# Patient Record
Sex: MALE | URBAN - METROPOLITAN AREA
[De-identification: names, ages, dates, MRNs, and addresses within clinical notes are randomized per-mention and may not be internally consistent; named-entity substitution may affect disease eponyms.]

---

## 2023-05-15 ENCOUNTER — OFFICE VISIT (OUTPATIENT)
Dept: URGENT CARE | Facility: CLINIC | Age: 38
End: 2023-05-15
Payer: COMMERCIAL

## 2023-05-15 VITALS
DIASTOLIC BLOOD PRESSURE: 119 MMHG | HEART RATE: 95 BPM | WEIGHT: 152 LBS | SYSTOLIC BLOOD PRESSURE: 174 MMHG | TEMPERATURE: 98 F | HEIGHT: 70 IN | OXYGEN SATURATION: 98 % | RESPIRATION RATE: 16 BRPM | BODY MASS INDEX: 21.76 KG/M2

## 2023-05-15 DIAGNOSIS — L03.90 CELLULITIS, UNSPECIFIED CELLULITIS SITE: Primary | ICD-10-CM

## 2023-05-15 PROCEDURE — 99203 PR OFFICE/OUTPT VISIT, NEW, LEVL III, 30-44 MIN: ICD-10-PCS | Mod: S$GLB,,, | Performed by: EMERGENCY MEDICINE

## 2023-05-15 PROCEDURE — 99203 OFFICE O/P NEW LOW 30 MIN: CPT | Mod: S$GLB,,, | Performed by: EMERGENCY MEDICINE

## 2023-05-15 RX ORDER — SULFAMETHOXAZOLE AND TRIMETHOPRIM 800; 160 MG/1; MG/1
1 TABLET ORAL 2 TIMES DAILY
Qty: 20 TABLET | Refills: 0 | Status: SHIPPED | OUTPATIENT
Start: 2023-05-15 | End: 2023-05-25

## 2023-05-15 RX ORDER — DIPHENHYDRAMINE HCL 12.5MG/5ML
25 ELIXIR ORAL 4 TIMES DAILY PRN
Qty: 236 ML | Refills: 0 | Status: SHIPPED | OUTPATIENT
Start: 2023-05-15

## 2023-05-15 NOTE — PROGRESS NOTES
"Subjective:      Patient ID: Orlin Carbajal is a 37 y.o. male.    Vitals:  height is 5' 10" (1.778 m) and weight is 68.9 kg (152 lb). His temperature is 98.4 °F (36.9 °C). His blood pressure is 174/119 (abnormal) and his pulse is 95. His respiration is 16 and oxygen saturation is 98%.     Chief Complaint: Insect Bite    Pt believes he got bit by spider on right arm, rash started about an hour & half ago. The past 20 mins spot has swollen up.  Has spot of redness on the right forearm close to the wrist without any fluctuance and some redness in the forearm proximal to that which is streaky in nature    Constitution: Negative.   HENT: Negative.     Neck: neck negative.   Cardiovascular: Negative.    Eyes: Negative.    Respiratory: Negative.     Gastrointestinal: Negative.    Endocrine: negative.   Genitourinary: Negative.    Musculoskeletal: Negative.    Skin:  Positive for rash and erythema.   Allergic/Immunologic: Negative.    Neurological: Negative.    Hematologic/Lymphatic: Negative.     Objective:     Physical Exam   Constitutional: He is oriented to person, place, and time. He appears well-developed. He is cooperative.   HENT:   Head: Normocephalic and atraumatic.   Ears:   Right Ear: Hearing, tympanic membrane, external ear and ear canal normal.   Left Ear: Hearing, tympanic membrane, external ear and ear canal normal.   Nose: Nose normal. No mucosal edema or nasal deformity. No epistaxis. Right sinus exhibits no maxillary sinus tenderness and no frontal sinus tenderness. Left sinus exhibits no maxillary sinus tenderness and no frontal sinus tenderness.   Mouth/Throat: Uvula is midline, oropharynx is clear and moist and mucous membranes are normal. No trismus in the jaw. Normal dentition. No uvula swelling.   Eyes: Conjunctivae and lids are normal.   Neck: Trachea normal and phonation normal. Neck supple.   Cardiovascular: Normal rate, regular rhythm, normal heart sounds and normal pulses.   Pulmonary/Chest: " Effort normal and breath sounds normal.   Abdominal: Normal appearance and bowel sounds are normal. Soft.   Musculoskeletal: Normal range of motion.         General: Normal range of motion.   Neurological: He is alert and oriented to person, place, and time. He exhibits normal muscle tone.   Skin: Skin is warm, dry and intact. erythema         Comments: Right wrist and forearm area with 3 x 3 cm area of cellulitis without any fluctuance with minimal induration.  Patient has rash proximal to that consistent with likely spreading of cellulitis.  Allergic reaction is considered as well.  Extremities neurovascularly intact   Psychiatric: His speech is normal and behavior is normal. Judgment and thought content normal.   Nursing note and vitals reviewed.    Assessment:     1. Cellulitis, unspecified cellulitis site        Plan:       Cellulitis, unspecified cellulitis site    Other orders  -     sulfamethoxazole-trimethoprim 800-160mg (BACTRIM DS) 800-160 mg Tab; Take 1 tablet by mouth 2 (two) times daily. for 10 days  Dispense: 20 tablet; Refill: 0  -     diphenhydrAMINE (BENADRYL) 12.5 mg/5 mL elixir; Take 10 mLs (25 mg total) by mouth 4 (four) times daily as needed for Allergies.  Dispense: 236 mL; Refill: 0          Medical Decision Making:   Differential Diagnosis:   37-year-old male with rash consistent with cellulitis.  Streaky nature proximal to the rash is concerning for possible early phlebitis so will treat with broad-spectrum antibiotic.  Patient's blood pressure is elevated and patient has hypertension.  Patient advised to have his blood pressure checked daily for the next few days and weekly at home and to follow up with primary care provider.  Patient will recheck the pressure wants the pain gets better after taking ibuprofen.  Return precautions given.

## 2023-05-15 NOTE — PATIENT INSTRUCTIONS
You do have a streak going up from the site of infection and if it is getting worse or if he starts running high fevers you must go to emergency department for IV antibiotics.  Return for worsening symptoms or any problems